# Patient Record
Sex: FEMALE | Race: WHITE | Employment: OTHER | URBAN - METROPOLITAN AREA
[De-identification: names, ages, dates, MRNs, and addresses within clinical notes are randomized per-mention and may not be internally consistent; named-entity substitution may affect disease eponyms.]

---

## 2017-04-06 ENCOUNTER — HOSPITAL ENCOUNTER (OUTPATIENT)
Dept: MAMMOGRAPHY | Age: 77
Discharge: HOME | End: 2017-04-06
Attending: FAMILY MEDICINE
Payer: MEDICARE

## 2017-04-06 DIAGNOSIS — Z12.31 VISIT FOR SCREENING MAMMOGRAM: ICD-10-CM

## 2017-04-06 PROCEDURE — 77067 SCR MAMMO BI INCL CAD: CPT

## 2017-06-15 ENCOUNTER — HOSPITAL ENCOUNTER (OUTPATIENT)
Dept: LAB | Age: 77
Discharge: HOME | End: 2017-06-15
Payer: MEDICARE

## 2017-06-15 DIAGNOSIS — L68.0 HIRSUTISM: ICD-10-CM

## 2017-06-15 PROCEDURE — 36415 COLL VENOUS BLD VENIPUNCTURE: CPT | Performed by: INTERNAL MEDICINE

## 2017-06-15 PROCEDURE — 82627 DEHYDROEPIANDROSTERONE: CPT | Performed by: INTERNAL MEDICINE

## 2017-06-15 PROCEDURE — 84403 ASSAY OF TOTAL TESTOSTERONE: CPT | Performed by: INTERNAL MEDICINE

## 2017-06-15 PROCEDURE — 82157 ASSAY OF ANDROSTENEDIONE: CPT | Performed by: INTERNAL MEDICINE

## 2017-06-17 LAB
DHEA-S SERPL-MCNC: 152.7 UG/DL (ref 13.9–142.8)
TESTOST FREE SERPL-MCNC: 6.2 PG/ML (ref 0–4.2)
TESTOST SERPL-MCNC: 25 NG/DL (ref 3–41)

## 2017-06-20 LAB — ANDROST SERPL-MCNC: 35 NG/DL (ref 17–99)

## 2017-06-20 NOTE — PROGRESS NOTES
Dr. David Guerrero, Ms. Lupillo Carltons' free testosterone and DHEA-sulfate are minimally elevated. That is consistent with PCOS. Because of the minimal elevation in the lab tests as well as the nonprogressive nature of her hirsutism, further evaluation is not necessary from my perspective. However, an empiric trial of Spironolactone (typically starting at 25 or 50 mg once or twice daily)  is reasonable. I will defer that to you. If spironolactone is not effective at reducing hirsutism, referral to a gynecologist or a reproductive endocrinologist may be appropriate as well. If you have questions or concerns, please let me know.